# Patient Record
Sex: FEMALE | Race: OTHER | Employment: FULL TIME | URBAN - METROPOLITAN AREA
[De-identification: names, ages, dates, MRNs, and addresses within clinical notes are randomized per-mention and may not be internally consistent; named-entity substitution may affect disease eponyms.]

---

## 2023-03-13 ENCOUNTER — HOSPITAL ENCOUNTER (EMERGENCY)
Facility: HOSPITAL | Age: 28
Discharge: HOME | End: 2023-03-13
Payer: COMMERCIAL

## 2023-03-13 VITALS
OXYGEN SATURATION: 100 % | SYSTOLIC BLOOD PRESSURE: 96 MMHG | TEMPERATURE: 97 F | BODY MASS INDEX: 19 KG/M2 | DIASTOLIC BLOOD PRESSURE: 65 MMHG | WEIGHT: 125 LBS | RESPIRATION RATE: 18 BRPM | HEART RATE: 91 BPM

## 2023-03-13 DIAGNOSIS — K62.89 ANAL OR RECTAL PAIN: Primary | ICD-10-CM

## 2023-03-13 LAB
ALBUMIN SERPL-MCNC: 4.2 G/DL (ref 3.4–5)
ALBUMIN/GLOB SERPL: 1.4 {RATIO} (ref 1–2)
ALP LIVER SERPL-CCNC: 53 U/L
ALT SERPL-CCNC: 10 U/L
ANION GAP SERPL CALC-SCNC: 4 MMOL/L (ref 0–18)
AST SERPL-CCNC: 10 U/L (ref 15–37)
BASOPHILS # BLD AUTO: 0.03 X10(3) UL (ref 0–0.2)
BASOPHILS NFR BLD AUTO: 0.3 %
BILIRUB SERPL-MCNC: 0.6 MG/DL (ref 0.1–2)
BILIRUB UR QL: NEGATIVE
BUN BLD-MCNC: 14 MG/DL (ref 7–18)
BUN/CREAT SERPL: 19.2 (ref 10–20)
CALCIUM BLD-MCNC: 8.9 MG/DL (ref 8.5–10.1)
CHLORIDE SERPL-SCNC: 106 MMOL/L (ref 98–112)
CO2 SERPL-SCNC: 27 MMOL/L (ref 21–32)
COLOR UR: YELLOW
CREAT BLD-MCNC: 0.73 MG/DL
DEPRECATED RDW RBC AUTO: 39 FL (ref 35.1–46.3)
EOSINOPHIL # BLD AUTO: 0.01 X10(3) UL (ref 0–0.7)
EOSINOPHIL NFR BLD AUTO: 0.1 %
ERYTHROCYTE [DISTWIDTH] IN BLOOD BY AUTOMATED COUNT: 12.3 % (ref 11–15)
GFR SERPLBLD BASED ON 1.73 SQ M-ARVRAT: 115 ML/MIN/1.73M2 (ref 60–?)
GLOBULIN PLAS-MCNC: 3 G/DL (ref 2.8–4.4)
GLUCOSE BLD-MCNC: 112 MG/DL (ref 70–99)
GLUCOSE UR-MCNC: NEGATIVE MG/DL
HCT VFR BLD AUTO: 33.7 %
HGB BLD-MCNC: 11.5 G/DL
HGB UR QL STRIP.AUTO: NEGATIVE
IMM GRANULOCYTES # BLD AUTO: 0.03 X10(3) UL (ref 0–1)
IMM GRANULOCYTES NFR BLD: 0.3 %
LIPASE SERPL-CCNC: 19 U/L (ref 13–75)
LYMPHOCYTES # BLD AUTO: 1.22 X10(3) UL (ref 1–4)
LYMPHOCYTES NFR BLD AUTO: 11.3 %
MCH RBC QN AUTO: 29.4 PG (ref 26–34)
MCHC RBC AUTO-ENTMCNC: 34.1 G/DL (ref 31–37)
MCV RBC AUTO: 86.2 FL
MONOCYTES # BLD AUTO: 0.74 X10(3) UL (ref 0.1–1)
MONOCYTES NFR BLD AUTO: 6.8 %
NEUTROPHILS # BLD AUTO: 8.8 X10 (3) UL (ref 1.5–7.7)
NEUTROPHILS # BLD AUTO: 8.8 X10(3) UL (ref 1.5–7.7)
NEUTROPHILS NFR BLD AUTO: 81.2 %
NITRITE UR QL STRIP.AUTO: NEGATIVE
OSMOLALITY SERPL CALC.SUM OF ELEC: 285 MOSM/KG (ref 275–295)
PH UR: 5 [PH] (ref 5–8)
PLATELET # BLD AUTO: 284 10(3)UL (ref 150–450)
POTASSIUM SERPL-SCNC: 4.4 MMOL/L (ref 3.5–5.1)
PROT SERPL-MCNC: 7.2 G/DL (ref 6.4–8.2)
PROT UR-MCNC: 100 MG/DL
RBC # BLD AUTO: 3.91 X10(6)UL
SODIUM SERPL-SCNC: 137 MMOL/L (ref 136–145)
SP GR UR STRIP: 1.02 (ref 1–1.03)
UROBILINOGEN UR STRIP-ACNC: <2
VIT C UR-MCNC: NEGATIVE MG/DL
WBC # BLD AUTO: 10.8 X10(3) UL (ref 4–11)

## 2023-03-13 PROCEDURE — 80053 COMPREHEN METABOLIC PANEL: CPT

## 2023-03-13 PROCEDURE — 81001 URINALYSIS AUTO W/SCOPE: CPT | Performed by: NURSE PRACTITIONER

## 2023-03-13 PROCEDURE — 96374 THER/PROPH/DIAG INJ IV PUSH: CPT

## 2023-03-13 PROCEDURE — 87086 URINE CULTURE/COLONY COUNT: CPT | Performed by: NURSE PRACTITIONER

## 2023-03-13 PROCEDURE — 99284 EMERGENCY DEPT VISIT MOD MDM: CPT

## 2023-03-13 PROCEDURE — 85025 COMPLETE CBC W/AUTO DIFF WBC: CPT

## 2023-03-13 PROCEDURE — 83690 ASSAY OF LIPASE: CPT | Performed by: NURSE PRACTITIONER

## 2023-03-13 RX ORDER — KETOROLAC TROMETHAMINE 15 MG/ML
15 INJECTION, SOLUTION INTRAMUSCULAR; INTRAVENOUS ONCE
Status: COMPLETED | OUTPATIENT
Start: 2023-03-13 | End: 2023-03-13

## 2023-03-13 NOTE — ED INITIAL ASSESSMENT (HPI)
Patient with c/o rectal pain that began this am. Also with c/o BRBPR when she has bowel movements. States it is only a scant amount of blood.

## 2023-03-13 NOTE — DISCHARGE INSTRUCTIONS
Tylenol 650 mg every 6 hours or 1000 mg every 8 hours for pain. Motrin 600 every 6 hours or 800 every 8 hours for pain. Do not take Motrin if you are already taking naproxen, Advil, Aleve, Ibuprofen, Voltaren or Toradol as these are all similar drugs. Do not take any of these drugs long-term. See your Surgeon this week. You can sign into [email or web address] and share your lab results. Stay Hydrated. Return to the ER with new or worsening concerns.

## 2023-09-28 ENCOUNTER — OFFICE VISIT (OUTPATIENT)
Dept: GASTROENTEROLOGY | Facility: CLINIC | Age: 28
End: 2023-09-28

## 2023-09-28 ENCOUNTER — TELEPHONE (OUTPATIENT)
Facility: CLINIC | Age: 28
End: 2023-09-28

## 2023-09-28 VITALS — BODY MASS INDEX: 19.26 KG/M2 | HEIGHT: 69 IN | WEIGHT: 130 LBS

## 2023-09-28 DIAGNOSIS — Z87.19 HISTORY OF ANAL FISSURES: ICD-10-CM

## 2023-09-28 DIAGNOSIS — K62.5 ANAL BLEEDING: Primary | ICD-10-CM

## 2023-09-28 DIAGNOSIS — K62.5 RECTAL BLEEDING: Primary | ICD-10-CM

## 2023-09-28 DIAGNOSIS — Z98.890 H/O HEMORRHOIDECTOMY: ICD-10-CM

## 2023-09-28 DIAGNOSIS — K64.5 THROMBOSED HEMORRHOIDS: ICD-10-CM

## 2023-09-28 DIAGNOSIS — Z98.890 HISTORY OF HEMORRHOIDECTOMY: ICD-10-CM

## 2023-09-28 PROCEDURE — 99204 OFFICE O/P NEW MOD 45 MIN: CPT | Performed by: INTERNAL MEDICINE

## 2023-09-28 PROCEDURE — 3008F BODY MASS INDEX DOCD: CPT | Performed by: INTERNAL MEDICINE

## 2023-09-28 RX ORDER — TRAMADOL HYDROCHLORIDE 50 MG/1
50 TABLET ORAL EVERY 6 HOURS PRN
COMMUNITY

## 2023-09-28 NOTE — TELEPHONE ENCOUNTER
Scheduled for:  Colonoscopy 57639  Provider Name:  Dr Brayan Corona  Date:  3/11/2024  Location: [facility]    Sedation:  MAC  Time:  5133 (pt is aware that [facility] will call the day before to confirm arrival time)    Prep:  Colyte  Meds/Allergies Reconciled?:  Physician reviewed  Diagnosis with codes:  Rectal Bleeding K6.5; Anal Fissures Z87.19; Thrombosed Hemorrhoids K64.5; Hx of Hemorrhoidectomy Z98.890  Was patient informed to call insurance with codes (Y/N):       Referral sent?: Referral was sent at the time of electronic surgical scheduling. [facility] or [address] notified?:  I sent an electronic request to Endo Scheduling and received a confirmation today. Medication Orders:  Pt is aware to NOT take iron pills, herbal meds and diet supplements for 7 days before exam. Also to NOT take any form of alcohol, recreational drugs and any forms of ED meds 24 hours before exam.     Misc Orders:       Further instructions given by staff:  I discussed the prep intructions with the patient in office which SHE verbally understood. Copy of instructions was handed to patient as well. Patient was also advised about cancellation policy.

## 2023-09-28 NOTE — H&P
[address] [facility]                                                                                                               Reason for consult: anal pain    Requesting physician or provider: Sarath Zamora    Patient presents with:  Consult      HPI:   Joel Aden is a 29year old year-old female here for the following:    Symptoms started 1 year ago with anal pain and bleeding. She was told she had hemorrhoids and pt was given conservative treatments, which didn't help. Then, pt saw her PCP, who referred her to a Dr. Edgard Klein from general surgery, who diagnosed an anal fissure. A procedure was done requiring anesthesia (digital rectal dilatation per chart review), because he wasn't able to even do a REJI in office. The day after, she had anal pain again. She was evaluated by another surgeon since Dr. Edgard Klein was out of the office and an anal fissure was diagnosed. On an office visit 2/2023 with Dr. Edgard Klein, she had no pain and rectal exam showed a thrombosed hemorrhoid. She then had surgical repair of the fissure along with a hemorrhoidectomy. She then followed up with him in March, and a sphincterectomy was recommended for a persistent fissure. She was symptomatic at that time. She felt well until the past week, when she developed anal pain with defecation and rectal bleeding again. Pt states she had stopped taking miralax every day and was taking it every other day or PRN until this past week. She notes that her stools may have been harder than usual. She tries to avoid straining or spending too much time on the toilet. Her BM yesterday was \"OK\" since she was able to prepare herself. Overall over the past week, her pain is not as severe as it was when symptoms first flared a week ago, and her rectal bleeding is intermittent after a BM.   Denies any other symptoms - no rashes, vision changes, joint pains, diarrhea, hematochezia, f/c, unintentional weight loss, or any other symptoms. Denies family h/o IBD or CRC. Prior endoscopies:  None. Soc:  -denies smoking  -denies heavy Etoh  -no illicit drug use    Wt Readings from Last 6 Encounters:  09/28/23 : 130 lb (59 kg)  03/13/23 : 125 lb (56.7 kg)       History, Medications, Allergies, ROS:      History reviewed. No pertinent past medical history. History reviewed. No pertinent surgical history. Family Hx: History reviewed. No pertinent family history. Social History:   Social History     Socioeconomic History    Marital status: Single   Tobacco Use    Smoking status: Never    Smokeless tobacco: Never   Substance and Sexual Activity    Alcohol use: Yes        Medications (Active prior to today's visit):  Current Outpatient Medications   Medication Sig Dispense Refill    traMADol 50 MG Oral Tab Take 1 tablet (50 mg total) by mouth every 6 (six) hours as needed for Pain.      polyethylene glycol, PEG 3350-KCl-NaBcb-NaCl-NaSulf, 236 g Oral Recon Soln Take 4,000 mL by mouth once for 1 dose. As directed by GI clinic instructions. 4000 mL 0       Allergies:  Not on File    ROS:   CONSTITUTIONAL:  negative for fevers, rigors  EYES:  negative for diplopia   RESPIRATORY:  negative for severe shortness of breath  CARDIOVASCULAR:  negative for crushing sub-sternal chest pain  GASTROINTESTINAL:  see HPI  GENITOURINARY:  negative for dysuria or gross hematuria  INTEGUMENT/BREAST:  SKIN:  negative for jaundice   ALLERGIC/IMMUNOLOGIC:  negative for hay fever  ENDOCRINE:  negative for cold intolerance and heat intolerance  MUSCULOSKELETAL:  negative for joint effusion/severe erythema  BEHAVIOR/PSYCH:  negative for psychotic behavior      PHYSICAL EXAM:   Height 5' 9\" (1.753 m), weight 130 lb (59 kg), last menstrual period 02/21/2023.     GEN - Patient appears comfortable and in no acute discomfort  ENT - MMM  EYES - the sclera appears anicteric  CV - no edema  RESP -  No increased work of breathing  ABDOMEN - soft, non-tender exam in all quadrants without rigidity or guarding, non-distended, no abnormal bowel sounds noted, no masses are palpated  REJI - resolving thrombosed external hemorrhoid noted; no anal fissure noted; REJI deferred   SKIN - No jaundice  NEURO - Alert and appropriate, and gross movements of extremities normal  PSYCH - normal affect, non-agitated      Labs/Imaging:     Patient's pertinent labs and imaging were reviewed and discussed with patient today. .  ASSESSMENT/PLAN:   Janae Hirsch is a 29year old year-old female here for the following:    H/o anal fissure  H/o hemorrhoids   H/o surgical repair of her anal fissure and hemorrhoidectomy [facility]/2023 at [facility]  Anal pain and rectal bleeding - recurrent over the past week and rectal exam today consistent with resolving thrombosed hemorrhoid. No fissure was appreciated. The pt's most recent flare of symptoms is likely due to a thrombosed hemorrhoid which has nearly resolved on exam today. Her pain is overall improving steadily. Recommend avoiding straining/constipation and keeping stools soft with miralax every day. Given her h/o recurrent anal fissure despite surgical intervention by Dr. Shahab Silverio, while there is no fissure noted on exam today, given previous findings of a persistent fissure and recommendations of her previous surgeon, recommend CLN to r/o Crohn's and referral to a colorectal surgeon for another opinion to determine the need for further surgical intervention.       Recommend    - CLN with MAC    - CLD the day prior, NPO after midnight, split dose golytely    - miralax daily - OK to adjust the dose to keep the stools soft and regular    - avoid straining or spending too much time on the toilet    - referral to colorectal surgery, Dr. Mariajose Jose    Colonoscopy consent: I have discussed the risks, benefits, and alternatives to colonoscopy with the patient/primary decision maker [who demonstrated understanding], including but not limited to the risks of bleeding, infection, pain, death, as well as the risks of anesthesia and perforation all leading to prolonged hospitalization, surgical intervention, or even death. I also specifically mentioned the miss rate of colonoscopy of 5-10% in the best of all circumstances. The patient has agreed to sign an informed consent and elected to proceed with colonoscopy with possible intervention [i.e. polypectomy, stent placement, etc.] as indicated. Orders This Visit:  No orders of the defined types were placed in this encounter. Meds This Visit:  Requested Prescriptions     Signed Prescriptions Disp Refills    polyethylene glycol, PEG 3350-KCl-NaBcb-NaCl-NaSulf, 236 g Oral Recon Soln 4000 mL 0     Sig: Take 4,000 mL by mouth once for 1 dose. As directed by GI clinic instructions. Imaging & Referrals:  SURGERY - Raynette Lundborg, MD          This note was partially prepared using "[employer] voice recognition dictation software. As a result, errors may occur. When identified, these errors have been corrected.  While every attempt is made to correct errors during dictation, discrepancies may still exist.

## 2023-09-28 NOTE — PATIENT INSTRUCTIONS
1. Schedule colonoscopy with MAC [Diagnosis: rectal bleeding, anal fissure]    2.  bowel prep from pharmacy (split dose golytely)    3. Continue all medications for procedure    4. Read all bowel prep instructions carefully    5. AVOID seeds, nuts, popcorn, raw fruits and vegetables (cooked is okay) for 3 days before procedure    6. If you start any NEW medication after your visit today, please notify us. Certain medications will need to be held before the procedure, or the procedure cannot be performed.        Colorectal surgeon referral - please call to schedule an appointment:  Dr. Melida See  [phone]

## 2023-10-19 ENCOUNTER — OFFICE VISIT (OUTPATIENT)
Facility: LOCATION | Age: 28
End: 2023-10-19
Payer: COMMERCIAL

## 2023-10-19 VITALS — HEART RATE: 98 BPM | TEMPERATURE: 98 F

## 2023-10-19 DIAGNOSIS — K60.2 ANAL FISSURE: Primary | ICD-10-CM

## 2023-10-19 PROCEDURE — 99203 OFFICE O/P NEW LOW 30 MIN: CPT | Performed by: STUDENT IN AN ORGANIZED HEALTH CARE EDUCATION/TRAINING PROGRAM

## 2023-10-20 ENCOUNTER — TELEPHONE (OUTPATIENT)
Facility: LOCATION | Age: 28
End: 2023-10-20

## 2024-01-29 ENCOUNTER — TELEPHONE (OUTPATIENT)
Facility: CLINIC | Age: 29
End: 2024-01-29

## 2024-03-11 ENCOUNTER — OFFICE VISIT (OUTPATIENT)
Facility: LOCATION | Age: 29
End: 2024-03-11
Payer: COMMERCIAL

## 2024-03-11 ENCOUNTER — TELEPHONE (OUTPATIENT)
Facility: CLINIC | Age: 29
End: 2024-03-11

## 2024-03-11 VITALS — TEMPERATURE: 98 F | HEART RATE: 98 BPM

## 2024-03-11 DIAGNOSIS — K64.5 THROMBOSED HEMORRHOIDS: Primary | ICD-10-CM

## 2024-03-11 PROCEDURE — 99213 OFFICE O/P EST LOW 20 MIN: CPT | Performed by: STUDENT IN AN ORGANIZED HEALTH CARE EDUCATION/TRAINING PROGRAM

## 2024-03-11 RX ORDER — HYDROCORTISONE ACETATE PRAMOXINE HCL 2.5; 1 G/100G; G/100G
1 CREAM TOPICAL 2 TIMES DAILY
Qty: 30 G | Refills: 0 | Status: SHIPPED | OUTPATIENT
Start: 2024-03-11

## 2024-03-11 NOTE — PROGRESS NOTES
Follow Up Visit Note       Active Problems      1. Thrombosed hemorrhoids          Chief Complaint   Chief Complaint   Patient presents with    [facility]     Est - Anal Fissure - Pt states she feels hemorrhoids externally, Pt c/o bleeding w/ BM's          History of Present Illness  29-year-old female with past medical history of anal fissure and hemorrhoids, returns to clinic today with concern for recurrence of both her fissure and hemorrhoids.  Patient was last seen here in October 2023 where at that time she had no ongoing anorectal symptoms and no evidence of fissure on bedside examination.  Patient reports about 2 to 3 weeks ago while traveling to [place], she developed some rectal discomfort and swelling, which she presumed could be secondary to her hemorrhoids.  She started using her prescribed lidocaine/nifedipine ointment at that time.  Patient also recently travel to the [facility] last week, and on her way home, she developed worsening anorectal symptoms.  Patient reports sharp, severe rectal pain at onset of bowel movement which she feels is similar to her previous anal fissure pain.  She also notes that her hemorrhoids are protruding.  Over the weekend, patient started to experience bright red blood per rectum with bowel movements.  She reports bright red blood on tissue paper, as well as in toilet bowl.  She states that she is having 1-2 soft bowel movements a day.  She denies any recent straining with bowel movements.  Takes MiraLAX daily.    Past surgical history notable for anal exam under anesthesia with anal dilation with Dr. Karen Jeffery a general surgeon at [facility] in [place], [place] on 1/23/2023.  She underwent repeat anal exam under anesthesia with dilation and excisional hemorrhoidectomy x2 on 3/15/2023.  She has yet to schedule a colonoscopy.    Allergies  Lachelle has no allergies on file.    Past Medical / Surgical / Social / Family History    The past medical and past surgical history  have been reviewed by me today.    History reviewed. No pertinent past medical history.  History reviewed. No pertinent surgical history.    The family history and social history have been reviewed by me today.    History reviewed. No pertinent family history.  Social History     Socioeconomic History    Marital status: Single   Tobacco Use    Smoking status: Never    Smokeless tobacco: Never   Substance and Sexual Activity    Alcohol use: Yes        Current Outpatient Medications:     Hydrocort-Pramoxine, Perianal, (ANALPRAM HC) 2.5-1 % External Cream, Apply 1 Application topically in the morning and 1 Application before bedtime., Disp: 30 g, Rfl: 0    traMADol 50 MG Oral Tab, Take 1 tablet (50 mg total) by mouth every 6 (six) hours as needed for Pain., Disp: , Rfl:      Review of Systems  A 10 point review of systems was performed and negative unless otherwise documented per HPI.     Physical Findings   Pulse 98   Temp 98.3 °F (36.8 °C)   LMP 02/21/2023 (Approximate)   Physical Exam  Vitals and nursing note reviewed. Exam conducted with a chaperone present.   Constitutional:       General: She is not in acute distress.  HENT:      Head: Normocephalic and atraumatic.      Mouth/Throat:      Mouth: Mucous membranes are moist.   Cardiovascular:      Rate and Rhythm: Normal rate and regular rhythm.   Pulmonary:      Effort: Pulmonary effort is normal.   Abdominal:      General: There is no distension.      Palpations: Abdomen is soft.      Tenderness: There is no abdominal tenderness.   Genitourinary:     Comments: Patient examined in the prone jackknife position with female physician assistant chaperone present.  External exam of the anus reveals a 1.5 cm thrombosed hemorrhoid in the right posterior position.  This area is tender to palpation and appears to be the source of the patient's anorectal pain.  Musculoskeletal:         General: No deformity.   Skin:     General: Skin is warm and dry.   Neurological:       General: No focal deficit present.      Mental Status: She is alert.   Psychiatric:         Mood and Affect: Mood normal.          Assessment   1. Thrombosed hemorrhoids      Lachelle Jarrell is a very nice 29 year old female with history of anal fissure and symptomatic hemorrhoids previously under the care of a [facility] at [facility] [place], [place] who established care with me on 10/19/2023.    Patient reports about 2 to 3 weeks ago while traveling to [place], she developed some rectal discomfort and swelling, which she presumed could be secondary to her hemorrhoids.  She started using her prescribed lidocaine/nifedipine ointment at that time.  Patient also recently travel to the [facility] last week, and on her way home, she developed worsening anorectal symptoms.  Patient reports sharp, severe rectal pain at onset of bowel movement which she feels is similar to her previous anal fissure pain.  She also notes that her hemorrhoids are protruding.  Over the weekend, patient started to experience bright red blood per rectum with bowel movements.  She reports bright red blood on tissue paper, as well as in toilet bowl.  She states that she is having 1-2 soft bowel movements a day.  She denies any recent straining with bowel movements.  Takes MiraLAX daily.  She has yet to schedule a colonoscopy.    External exam of the anus reveals a 1.5 cm thrombosed hemorrhoid in the right posterior position.  This area is tender to palpation and appears to be the source of the patient's anorectal pain.  Patient did tell me that the pain and swelling are now beginning to improve.    Plan   I counseled patient on my exam findings that she has a large thrombosed hemorrhoid in the right posterior position which appears to be the source of her pain.  I recommend ongoing supportive care for the thrombosed hemorrhoid with pain medication, topical medication, bowel regimen and sitz bath's.  I counseled patient that the natural history of  thrombosed hemorrhoids are to slowly resolve with time and may result in a skin tag.  I encouraged patient to schedule a colonoscopy with her gastroenterologist.  I would like to see her back in the next 1-2 months for re-evaluation.  Return precautions in the meantime discussed.  Patient expressed understanding and was agreeable to plan.     No orders of the defined types were placed in this encounter.      Imaging & Referrals   None    Follow Up  No follow-ups on file.    Jax Melcohr MD

## 2024-03-19 ENCOUNTER — TELEPHONE (OUTPATIENT)
Facility: CLINIC | Age: 29
End: 2024-03-19

## 2024-03-19 NOTE — TELEPHONE ENCOUNTER
04/01 is holding for the patient she will call back at the end of the day once she speaks with her boss

## 2024-03-19 NOTE — TELEPHONE ENCOUNTER
Patient calling to inform nurse that she will not be able to accept that date. Please will call back tomorrow, thanks.

## 2024-03-19 NOTE — TELEPHONE ENCOUNTER
03/25 is holding for the patient she will call back at the end of the day once she speaks with her boss

## 2024-03-25 ENCOUNTER — TELEPHONE (OUTPATIENT)
Facility: CLINIC | Age: 29
End: 2024-03-25

## 2024-03-25 NOTE — TELEPHONE ENCOUNTER
[employer] message read: Last read by Lachelle Jarrell at 10:32 AM on 3/25/2024.     No further action required at this time, TE closed.

## 2024-03-25 NOTE — TELEPHONE ENCOUNTER
Pt is requesting for prep medicine please follow up [id number] - [place], [place] - [facility] [address] AT [facility] [address], [phone], [phone]

## 2024-03-25 NOTE — TELEPHONE ENCOUNTER
Prep sent to pharmacy below, notiifed pt via [email or web address] message sent.     Pt to call GI office if having questions.

## 2024-04-01 PROCEDURE — 88305 TISSUE EXAM BY PATHOLOGIST: CPT | Performed by: INTERNAL MEDICINE

## 2024-11-05 ENCOUNTER — OFFICE VISIT (OUTPATIENT)
Facility: LOCATION | Age: 29
End: 2024-11-05
Payer: COMMERCIAL

## 2024-11-05 VITALS
HEART RATE: 98 BPM | TEMPERATURE: 98 F | OXYGEN SATURATION: 100 % | SYSTOLIC BLOOD PRESSURE: 95 MMHG | DIASTOLIC BLOOD PRESSURE: 65 MMHG

## 2024-11-05 DIAGNOSIS — K60.2 ANAL FISSURE: Primary | ICD-10-CM

## 2024-11-05 DIAGNOSIS — K62.89 RECTAL PAIN: ICD-10-CM

## 2024-11-05 DIAGNOSIS — K64.4 EXTERNAL HEMORRHOID: ICD-10-CM

## 2024-11-05 PROCEDURE — 99213 OFFICE O/P EST LOW 20 MIN: CPT | Performed by: STUDENT IN AN ORGANIZED HEALTH CARE EDUCATION/TRAINING PROGRAM

## 2024-11-14 NOTE — PROGRESS NOTES
Follow Up Visit Note       Active Problems      1. Anal fissure    2. External hemorrhoid    3. Rectal pain          Chief Complaint   Chief Complaint   Patient presents with    [facility]     EP - Hemorrhoids F/U , discomfort, bleeding after using restroom, no other symptoms.         History of Present Illness  This is a very nice 29-year-old female with history of an anal fissure and symptomatic hemorrhoids who returns to clinic today with concern for recurrence of both her fissure and hemorrhoids.  Past surgical history notable for anal exam under anesthesia with anal dilation with Dr. Karen Jeffery a general surgeon at [facility] in [place], [place] on 1/23/2023.  She underwent repeat anal exam under anesthesia with dilation and excisional hemorrhoidectomy x2 on 3/15/2023. She underwent a colonoscopy with Dr. Sanz on 4/1/2024 which showed small internal hemorrhoids and anal skin tags.    Patient continues to have ongoing, intermittent rectal pain and bleeding.  The rectal pain generally happens after bowel movements.  At times, the pain occurs even if she is not having a bowel movement.  She had an episode of bad rectal pain around a month ago with bleeding.  The bleeding is primarily with wiping after bowel movements.  She feels she is always constipated.  Denies any prolapse, seepage, itching or incontinence.  She is using the nifedipine/lidocaine ointment as needed when she has episodes of bad pain.    Allergies  Lachelle has No Known Allergies.    Past Medical / Surgical / Social / Family History    The past medical and past surgical history have been reviewed by me today.    History reviewed. No pertinent past medical history.  Past Surgical History:   Procedure Laterality Date    Colonoscopy N/A 4/1/2024    Procedure: COLONOSCOPY;  Surgeon: Justina Sanz MD;  Location: [facility] MAIN OR    General anesthesia      hemorrhoidectomy       The family history and social history have been reviewed by me today.    History  reviewed. No pertinent family history.  Social History     Socioeconomic History    Marital status: Single   Tobacco Use    Smoking status: Never    Smokeless tobacco: Never   Substance and Sexual Activity    Alcohol use: Yes        Current Outpatient Medications:     Hydrocort-Pramoxine, Perianal, (ANALPRAM HC) 2.5-1 % External Cream, Apply 1 Application topically in the morning and 1 Application before bedtime., Disp: 30 g, Rfl: 0    traMADol 50 MG Oral Tab, Take 1 tablet (50 mg total) by mouth every 6 (six) hours as needed for Pain. (Patient not taking: Reported on 11/5/2024), Disp: , Rfl:      Review of Systems  A 10 point review of systems was performed and negative unless otherwise documented per HPI.     Physical Findings   BP 95/65 (BP Location: Left arm, Patient Position: Sitting, Cuff Size: adult)   Pulse 98   Temp 97.8 °F (36.6 °C) (Temporal)   LMP 03/20/2024   SpO2 100%   Physical Exam  Vitals and nursing note reviewed. Exam conducted with a chaperone present.   Constitutional:       General: She is not in acute distress.  HENT:      Head: Normocephalic and atraumatic.      Mouth/Throat:      Mouth: Mucous membranes are moist.   Cardiovascular:      Rate and Rhythm: Normal rate and regular rhythm.   Pulmonary:      Effort: Pulmonary effort is normal.   Abdominal:      General: There is no distension.      Palpations: Abdomen is soft.      Tenderness: There is no abdominal tenderness.   Genitourinary:     Comments: Patient examined in the prone jackknife position with female physician assistant chaperone present.  External exam of the anus reveals a 1.5 cm prominent right posterior external hemorrhoid.  Gentle spreading of the anoderm reveals a skin tear in the posterior midline with tenderness.  Digital rectal exam and anoscopy deferred.  Musculoskeletal:         General: No deformity.   Skin:     General: Skin is warm and dry.   Neurological:      General: No focal deficit present.      Mental  Status: She is alert.   Psychiatric:         Mood and Affect: Mood normal.          Assessment   1. Anal fissure    2. External hemorrhoid    3. Rectal pain      This is a very nice 29-year-old female with history of an anal fissure and symptomatic hemorrhoids who returns to clinic today with concern for recurrence of both her fissure and hemorrhoids.  Past surgical history notable for anal exam under anesthesia with anal dilation with Dr. Karen Jeffery a general surgeon at [facility] in [place], [place] on 1/23/2023.  She underwent repeat anal exam under anesthesia with dilation and excisional hemorrhoidectomy x2 on 3/15/2023. She underwent a colonoscopy with Dr. Sanz on 4/1/2024 which showed small internal hemorrhoids and anal skin tags.    Patient continues to have ongoing, intermittent rectal pain and bleeding.  The rectal pain generally happens after bowel movements.  At times, the pain occurs even if she is not having a bowel movement.  She had an episode of bad rectal pain around a month ago with bleeding.  The bleeding is primarily with wiping after bowel movements.  She feels she is always constipated.  Denies any prolapse, seepage, itching or incontinence.  She is using the nifedipine/lidocaine ointment as needed when she has episodes of bad pain.    External exam of the anus reveals a 1.5 cm prominent right posterior external hemorrhoid.  Gentle spreading of the anoderm reveals a skin tear in the posterior midline with tenderness.  Digital rectal exam and anoscopy deferred    Plan   I counseled patient on my exam findings that she has a prominent posterior external hemorrhoid/skin in addition to hypertonic sphincter tone with tearing of the posterior midline anoderm and possible fissure.  I recommend planning to go to the operating room for anal exam by anesthesia anoscopy, perianal injection of Botox and external hemorrhoidectomy.  The details of this procedure were discussed including the expected recovery  time, risks, benefits and alternatives.  Patient expressed understanding and was agreeable to schedule surgery with me.  This has been scheduled for 12/20/2024.    In the meantime, I suggest a high-fiber diet, daily fiber supplement, drink at least 64 ounces of fluids daily and limiting toilet time/straining.  I advised patient to continue to use the nifedipine/lidocaine ointment at least twice a day if possible, ideally 3 times a day.  Patient expressed understanding and was agreeable to plan.     No orders of the defined types were placed in this encounter.      Imaging & Referrals   None    Follow Up  No follow-ups on file.    Jax Melchor MD

## 2024-12-12 ENCOUNTER — TELEPHONE (OUTPATIENT)
Facility: LOCATION | Age: 29
End: 2024-12-12

## 2024-12-12 NOTE — TELEPHONE ENCOUNTER
Prior authorization approved for cpt codes 66144, 37205, 18306, . Ref #: [id number].    Start date: 12/6/24  End date: 12/20/24

## 2024-12-15 ENCOUNTER — PATIENT MESSAGE (OUTPATIENT)
Facility: LOCATION | Age: 29
End: 2024-12-15

## 2024-12-19 ENCOUNTER — TELEPHONE (OUTPATIENT)
Facility: LOCATION | Age: 29
End: 2024-12-19

## 2024-12-19 NOTE — TELEPHONE ENCOUNTER
Spoke with patient. She is starting her menstrual cycle and is wondering if it is ok to wear a tampon for the procedure, which was ok'd. Also just confirming tylenol and Gatorade instructions that were given to her.

## 2024-12-19 NOTE — TELEPHONE ENCOUNTER
Patient calling with questions about her prep for her procedure tomorrow 12/20.     Please advise  Best callback number is [phone]

## 2024-12-20 ENCOUNTER — ANESTHESIA (OUTPATIENT)
Dept: SURGERY | Facility: HOSPITAL | Age: 29
End: 2024-12-20
Payer: COMMERCIAL

## 2024-12-20 ENCOUNTER — ANESTHESIA EVENT (OUTPATIENT)
Dept: SURGERY | Facility: HOSPITAL | Age: 29
End: 2024-12-20
Payer: COMMERCIAL

## 2024-12-20 ENCOUNTER — HOSPITAL ENCOUNTER (OUTPATIENT)
Facility: HOSPITAL | Age: 29
Setting detail: HOSPITAL OUTPATIENT SURGERY
Discharge: HOME | End: 2024-12-20
Attending: STUDENT IN AN ORGANIZED HEALTH CARE EDUCATION/TRAINING PROGRAM | Admitting: STUDENT IN AN ORGANIZED HEALTH CARE EDUCATION/TRAINING PROGRAM

## 2024-12-20 PROBLEM — K64.3 GRADE IV HEMORRHOIDS: Status: ACTIVE | Noted: 2024-12-20

## 2024-12-20 RX ORDER — ROCURONIUM BROMIDE 10 MG/ML
INJECTION, SOLUTION INTRAVENOUS AS NEEDED
Status: DISCONTINUED | OUTPATIENT
Start: 2024-12-20 | End: 2024-12-20 | Stop reason: SURG

## 2024-12-20 RX ORDER — DEXAMETHASONE SODIUM PHOSPHATE 4 MG/ML
VIAL (ML) INJECTION AS NEEDED
Status: DISCONTINUED | OUTPATIENT
Start: 2024-12-20 | End: 2024-12-20 | Stop reason: SURG

## 2024-12-20 RX ORDER — ONDANSETRON 2 MG/ML
INJECTION INTRAMUSCULAR; INTRAVENOUS AS NEEDED
Status: DISCONTINUED | OUTPATIENT
Start: 2024-12-20 | End: 2024-12-20 | Stop reason: SURG

## 2024-12-20 RX ORDER — KETOROLAC TROMETHAMINE 30 MG/ML
INJECTION, SOLUTION INTRAMUSCULAR; INTRAVENOUS AS NEEDED
Status: DISCONTINUED | OUTPATIENT
Start: 2024-12-20 | End: 2024-12-20 | Stop reason: SURG

## 2024-12-20 RX ADMIN — METRONIDAZOLE 500 MG: 500 INJECTION, SOLUTION INTRAVENOUS at 10:57:00

## 2024-12-20 RX ADMIN — ROCURONIUM BROMIDE 20 MG: 10 INJECTION, SOLUTION INTRAVENOUS at 11:01:00

## 2024-12-20 RX ADMIN — SODIUM CHLORIDE, SODIUM LACTATE, POTASSIUM CHLORIDE, CALCIUM CHLORIDE: 600; 310; 30; 20 INJECTION, SOLUTION INTRAVENOUS at 11:41:00

## 2024-12-20 RX ADMIN — ROCURONIUM BROMIDE 10 MG: 10 INJECTION, SOLUTION INTRAVENOUS at 10:53:00

## 2024-12-20 RX ADMIN — KETOROLAC TROMETHAMINE 30 MG: 30 INJECTION, SOLUTION INTRAMUSCULAR; INTRAVENOUS at 11:12:00

## 2024-12-20 RX ADMIN — SODIUM CHLORIDE, SODIUM LACTATE, POTASSIUM CHLORIDE, CALCIUM CHLORIDE: 600; 310; 30; 20 INJECTION, SOLUTION INTRAVENOUS at 11:01:00

## 2024-12-20 RX ADMIN — DEXAMETHASONE SODIUM PHOSPHATE 8 MG: 4 MG/ML VIAL (ML) INJECTION at 11:00:00

## 2024-12-20 RX ADMIN — ONDANSETRON 4 MG: 2 INJECTION INTRAMUSCULAR; INTRAVENOUS at 11:00:00

## 2024-12-20 NOTE — H&P
Follow Up Visit Note       Active Problems      No diagnosis found.        Chief Complaint   No chief complaint on file.        History of Present Illness  This is a very nice 29-year-old female with history of an anal fissure and symptomatic hemorrhoids who returns to clinic today with concern for recurrence of both her fissure and hemorrhoids.  Past surgical history notable for anal exam under anesthesia with anal dilation with Dr. Karen Jeffery a [facility] surgeon at [facility] [place], [place] on 1/23/2023.  She underwent repeat anal exam under anesthesia with dilation and excisional hemorrhoidectomy x2 on 3/15/2023. She underwent a colonoscopy with Dr. Sanz on 4/1/2024 which showed small internal hemorrhoids and anal skin tags.    Patient continues to have ongoing, intermittent rectal pain and bleeding.  The rectal pain generally happens after bowel movements.  At times, the pain occurs even if she is not having a bowel movement.  She had an episode of bad rectal pain around a month ago with bleeding.  The bleeding is primarily with wiping after bowel movements.  She feels she is always constipated.  Denies any prolapse, seepage, itching or incontinence.  She is using the nifedipine/lidocaine ointment as needed when she has episodes of bad pain.    Allergies  Lachelle has No Known Allergies.    Past Medical / Surgical / Social / Family History    The past medical and past surgical history have been reviewed by me today.    History reviewed. No pertinent past medical history.  Past Surgical History:   Procedure Laterality Date    Colonoscopy N/A 4/1/2024    Procedure: COLONOSCOPY;  Surgeon: Justina Sanz MD;  Location: [facility] MAIN OR    General anesthesia      hemorrhoidectomy       The family history and social history have been reviewed by me today.    History reviewed. No pertinent family history.  Social History     Socioeconomic History    Marital status: Single   Tobacco Use    Smoking status: Never     Passive  exposure: Never    Smokeless tobacco: Never   Vaping Use    Vaping status: Never Used   Substance and Sexual Activity    Alcohol use: Yes     Comment: SOCIAL    Drug use: Never      No current outpatient medications on file.     Review of Systems  A 10 point review of systems was performed and negative unless otherwise documented per HPI.     Physical Findings   Ht 69\"   Wt 125 lb (56.7 kg)   LMP 11/20/2024 (Approximate)   BMI 18.46 kg/m²   Physical Exam  Vitals and nursing note reviewed. Exam conducted with a chaperone present.   Constitutional:       General: She is not in acute distress.  HENT:      Head: Normocephalic and atraumatic.      Mouth/Throat:      Mouth: Mucous membranes are moist.   Cardiovascular:      Rate and Rhythm: Normal rate and regular rhythm.   Pulmonary:      Effort: Pulmonary effort is normal.   Abdominal:      General: There is no distension.      Palpations: Abdomen is soft.      Tenderness: There is no abdominal tenderness.   Genitourinary:     Comments: Patient examined in the prone jackknife position with female physician assistant chaperone present.  External exam of the anus reveals a 1.5 cm prominent right posterior external hemorrhoid.  Gentle spreading of the anoderm reveals a skin tear in the posterior midline with tenderness.  Digital rectal exam and anoscopy deferred.  Musculoskeletal:         General: No deformity.   Skin:     General: Skin is warm and dry.   Neurological:      General: No focal deficit present.      Mental Status: She is alert.   Psychiatric:         Mood and Affect: Mood normal.          Assessment   No diagnosis found.    This is a very nice 29-year-old female with history of an anal fissure and symptomatic hemorrhoids who returns to clinic today with concern for recurrence of both her fissure and hemorrhoids.  Past surgical history notable for anal exam under anesthesia with anal dilation with Dr. Karen Jeffery a general surgeon at [facility] in [place], [place]  on 1/23/2023.  She underwent repeat anal exam under anesthesia with dilation and excisional hemorrhoidectomy x2 on 3/15/2023. She underwent a colonoscopy with Dr. Sanz on 4/1/2024 which showed small internal hemorrhoids and anal skin tags.    Patient continues to have ongoing, intermittent rectal pain and bleeding.  The rectal pain generally happens after bowel movements.  At times, the pain occurs even if she is not having a bowel movement.  She had an episode of bad rectal pain around a month ago with bleeding.  The bleeding is primarily with wiping after bowel movements.  She feels she is always constipated.  Denies any prolapse, seepage, itching or incontinence.  She is using the nifedipine/lidocaine ointment as needed when she has episodes of bad pain.    External exam of the anus reveals a 1.5 cm prominent right posterior external hemorrhoid.  Gentle spreading of the anoderm reveals a skin tear in the posterior midline with tenderness.  Digital rectal exam and anoscopy deferred    Plan   I counseled patient on my exam findings that she has a prominent posterior external hemorrhoid/skin in addition to hypertonic sphincter tone with tearing of the posterior midline anoderm and possible fissure.  I recommend planning to go to the operating room for anal exam by anesthesia anoscopy, perianal injection of Botox and external hemorrhoidectomy.  The details of this procedure were discussed including the expected recovery time, risks, benefits and alternatives.  Patient expressed understanding and was agreeable to schedule surgery with me.  This has been scheduled for 12/20/2024.    In the meantime, I suggest a high-fiber diet, daily fiber supplement, drink at least 64 ounces of fluids daily and limiting toilet time/straining.  I advised patient to continue to use the nifedipine/lidocaine ointment at least twice a day if possible, ideally 3 times a day.  Patient expressed understanding and was agreeable to plan.      No orders of the defined types were placed in this encounter.      Imaging & Referrals   VITAL SIGNS  NURSING COMMUNICATION  POCT PREGNANCY URINE  PLACE PIV  ACTIVITY AS TOLERATED  HEIGHT AND WEIGHT  INITIATE ADULT PREOP PROPHYLACTIC ABX PROTOCOL  VERIFY INFORMED CONSENT  NPO  VITAL SIGNS - NOTIFY PHYSICIAN    Follow Up  No follow-ups on file.    Jax Melchor MD

## 2024-12-20 NOTE — OPERATIVE REPORT
[facility]  Operative Note    Lachelle Jarrell Location: OR   [facility] [id number] MRN [id number]    1995 Age 29 year old   Admission Date 2024 Operation Date 2024   Attending Physician Jax Melchor MD Operating Physician Jax Melchor MD   PCP NASIM CACERES          Patient Name: Lachelle Jarrell    Preoperative Diagnosis: Anal fissure [K60.2]  External hemorrhoid [K64.4]  Rectal pain [K62.89]    Postoperative Diagnosis:   Grade 4 hemorrhoid  Anal fissure    Primary Surgeon: Jax Melchor MD    Assistant: Marielos Belle PA-C    Anesthesia: General    Procedures: Anal exam under anesthesia with anoscopy, excisional hemorrhoidectomy, perianal injection of Botox    Implants: None    Specimen: Right lateral hemorrhoid    Drains: None    Estimated Blood Loss: 5 cc    Complications: None immediate    Condition: Stable    Indications for Surgery:   This is a very nice 29-year-old female with history of an anal fissure and symptomatic hemorrhoids who returns to clinic today with concern for recurrence of both her fissure and hemorrhoids.  Past surgical history notable for anal exam under anesthesia with anal dilation with Dr. Karen Jeffery a general surgeon at [facility] in [place], [place] on 2023.  She underwent repeat anal exam under anesthesia with dilation and excisional hemorrhoidectomy x2 on 3/15/2023. She underwent a colonoscopy with Dr. Sanz on 2024 which showed small internal hemorrhoids and anal skin tags.     Patient continues to have ongoing, intermittent rectal pain and bleeding.  The rectal pain generally happens after bowel movements.  At times, the pain occurs even if she is not having a bowel movement.  She had an episode of bad rectal pain around a month ago with bleeding.  The bleeding is primarily with wiping after bowel movements.  She feels she is always constipated.  Denies any prolapse, seepage, itching or incontinence.  She is using the nifedipine/lidocaine ointment as needed when  she has episodes of bad pain.     External exam of the anus reveals a 1.5 cm prominent right posterior external hemorrhoid.  Gentle spreading of the anoderm reveals a skin tear in the posterior midline with tenderness.  Digital rectal exam and anoscopy deferred     I counseled patient on my exam findings that she has a prominent posterior external hemorrhoid/skin in addition to hypertonic sphincter tone with tearing of the posterior midline anoderm and possible fissure.  I recommend planning to go to the operating room for anal exam by anesthesia anoscopy, perianal injection of Botox and external hemorrhoidectomy.  The details of this procedure were discussed including the expected recovery time, risks, benefits and alternatives.  Patient expressed understanding and was agreeable to schedule surgery with me.  This has been scheduled for 12/20/2024.    Patient presents for elective surgery today.  Consent was signed.  All questions answered.    Surgical Findings:   Large right posterior lateral hemorrhoid with prominent external component and prolapsed, partially necrotic internal component.  Hypertonic sphincter tone and small posterior midline anal fissure.    Description of Procedure:   Patient was brought to the operating room on the transport cart.  Bilateral sequential compression devices were placed. Preoperative antibiotics were given.  Patient was induced under general endotracheal anesthesia.  Patient was then carefully flipped prone onto the OR table with all pressure points well-padded.  Patient was positioned in prone jackknife with the buttocks retracted apart with silk tape.  The anus and perianal skin were prepped and draped in the usual sterile fashion.  A timeout was performed.    I began with external exam of the anus, digital rectal exam and anoscopy using a small Hill-Toussaint anoscope.  I encountered the findings as described above.  I decided to start with excisional hemorrhoidectomy.  The  prominent right posterior lateral hemorrhoid was grasped with an Allis clamp and elevated.  The skin and hemorrhoid tissue was dissected free from the underlying sphincter muscle using electrocautery.  The hemorrhoid tissue was passed off the field and sent to pathology.  Hemostasis was achieved within the wound base using electrocautery.    There was evidence of hypertonic sphincter tone and a small posterior midline anal fissure.  Therefore, I decided to proceed with perianal injection of Botox. 100 units of Botox was mixed with 7 cc of injectable saline.  A Skelton bivalve anoscope was used to place the anus under stretch and I was able to easily identify the internal anal sphincter muscle.  I injected the internal sphincter with 0.5 cc of the Botox solution circumferentially at 12 clock face positions. The injection sites were hemostatic.     The anal canal was irrigated with warm saline solution and appeared hemostatic.  The mucosa at the hemorrhoidectomy excision site was reapproximated using a running, locking 2-0 Vicryl suture.  The skin at the hemorrhoidectomy excision site was reapproximated using interrupted 3-0 chromic sutures.  30 cc of 0.5% Marcaine with epinephrine was injected around the hemorrhoidectomy incision line and anus for local anesthesia.  The skin was cleaned and dried and a dressing of 4 x 4 gauze, ABD pad, tape and underwear was applied.    Patient was carefully flipped back supine onto the transport cart, awakened from anesthesia, extubated and transferred to the postanesthesia care unit in stable condition.  All sponge, needle, and instrument counts were correct at the end of the case.  I was present for the entire case.        Jax Melchor MD  12/20/2024  11:54 AM

## 2024-12-20 NOTE — ANESTHESIA POSTPROCEDURE EVALUATION
[facility]    Lachelle Jarrell Patient Status:  Hospital Outpatient Surgery   Age/Gender 29 year old female MRN [id number]   Location [facility] POST ANESTHESIA CARE UNIT Attending Jax Melchor MD   Hosp Day # 0 PCP NASIM STEPHANIERAMONA       Anesthesia Post-op Note    ANAL EXAMINATION UNDER ANESTHESIA EXCISIONAL HEMORRHOIDECTOMY PERIANAL INJECTION OF BOTOX, ANUSOCOPY    Procedure Summary       Date: 12/20/24 Room / Location:  MAIN OR  /  MAIN OR    Anesthesia Start: 1048 Anesthesia Stop:     Procedure: ANAL EXAMINATION UNDER ANESTHESIA EXCISIONAL HEMORRHOIDECTOMY PERIANAL INJECTION OF BOTOX, ANUSOCOPY Diagnosis:       Anal fissure      External hemorrhoid      Rectal pain      (Anal fissure [K60.2]External hemorrhoid [K64.4]Rectal pain [K62.89])    Surgeons: Jax Melchor MD Anesthesiologist: Jefferson Babin MD    Anesthesia Type: general ASA Status: 1            Anesthesia Type: general    Vitals Value Taken Time   /55 12/20/24 1142   Temp 98 12/20/24 1142   Pulse 101 12/20/24 1142   Resp 18 12/20/24 1142   SpO2 99 12/20/24 1142       Patient Location: PACU    Anesthesia Type: general    Airway Patency: patent    Postop Pain Control: adequate    Mental Status: mildly sedated but able to meaningfully participate in the post-anesthesia evaluation    Nausea/Vomiting: none    Cardiopulmonary/Hydration status: stable euvolemic    Complications: no apparent anesthesia related complications    Postop vital signs: stable    Dental Exam: Unchanged from Preop    Patient to be discharged from PACU when criteria met.

## 2024-12-20 NOTE — ANESTHESIA PROCEDURE NOTES
Airway  Date/Time: 12/20/2024 10:57 AM  Urgency: elective      General Information and Staff    Patient location during procedure: OR  Anesthesiologist: Jefferson Babin MD  Performed: anesthesiologist   Performed by: Jefferson Babin MD  Authorized by: Jefferson Babin MD      Indications and Patient Condition  Indications for airway management: anesthesia  Sedation level: deep  Preoxygenated: yes  Patient position: sniffing  Mask difficulty assessment: 1 - vent by mask    Final Airway Details  Final airway type: endotracheal airway      Successful airway: ETT  Cuffed: yes   Successful intubation technique: direct laryngoscopy  Endotracheal tube insertion site: oral  Blade: Adama  Blade size: #4  ETT size (mm): 7.0    Placement verified by: capnometry   Measured from: lips  Number of attempts at approach: 1

## 2024-12-20 NOTE — ANESTHESIA PREPROCEDURE EVALUATION
PRE-OP EVALUATION    Patient Name: Lachelle Jarrell    Admit Diagnosis: Anal fissure [K60.2]  External hemorrhoid [K64.4]  Rectal pain [K62.89]    Pre-op Diagnosis: Anal fissure [K60.2]  External hemorrhoid [K64.4]  Rectal pain [K62.89]    ANAL EXAMINATION UNDER ANESTHESIA EXCISIONAL HEMORRHOIDECTOMY PERIANAL INJECTION OF BOTOX    Anesthesia Procedure: ANAL EXAMINATION UNDER ANESTHESIA EXCISIONAL HEMORRHOIDECTOMY PERIANAL INJECTION OF BOTOX  HEMORRHOIDECTOMY    Surgeons and Role:     * Jax Melchor MD - Primary    Pre-op vitals reviewed.  Temp: 98.5 °F (36.9 °C)  Pulse: 73  Resp: 16  BP: 112/64  SpO2: 99 %  Body mass index is 18.61 kg/m².    Current medications reviewed.  Hospital Medications:   acetaminophen (Tylenol Extra Strength) tab 1,000 mg  1,000 mg Oral Once    scopolamine (Transderm-Scop) 1 MG/3DAYS patch 1 patch  1 patch Transdermal Once    lactated ringers infusion   Intravenous Continuous    ceFAZolin (Ancef) 2g in 10mL IV syringe premix  2 g Intravenous Once    And    metroNIDAZOLE in sodium chloride 0.79% (Flagyl) 5 mg/mL IVPB premix 500 mg  500 mg Intravenous Once       Outpatient Medications:   Prescriptions Prior to Admission[1]    Allergies: Patient has no known allergies.      Anesthesia Evaluation    Patient summary reviewed.    Anesthetic Complications           GI/Hepatic/Renal    Negative GI/hepatic/renal ROS.                             Cardiovascular    Negative cardiovascular ROS.                                                   Endo/Other    Negative endo/other ROS.                              Pulmonary    Negative pulmonary ROS.                       Neuro/Psych    Negative neuro/psych ROS.                                  Past Surgical History:   Procedure Laterality Date    Colonoscopy N/A 4/1/2024    Procedure: COLONOSCOPY;  Surgeon: Justina Sanz MD;  Location: [facility] MAIN OR    General anesthesia      hemorrhoidectomy     Social History     Socioeconomic History    Marital status:  Single   Tobacco Use    Smoking status: Never     Passive exposure: Never    Smokeless tobacco: Never   Vaping Use    Vaping status: Never Used   Substance and Sexual Activity    Alcohol use: Yes     Comment: SOCIAL    Drug use: Never     History   Drug Use Unknown     Available pre-op labs reviewed.               Airway      Mallampati: II       Cardiovascular    Cardiovascular exam normal.         Dental    Dentition appears grossly intact         Pulmonary    Pulmonary exam normal.                 Other findings              ASA: 1   Plan: general  NPO status verified and           Plan/risks discussed with: patient                Present on Admission:  **None**             [1]   Medications Prior to Admission   Medication Sig Dispense Refill Last Dose/Taking    Hydrocort-Pramoxine, Perianal, (ANALPRAM HC) 2.5-1 % External Cream Apply 1 Application topically in the morning and 1 Application before bedtime. 30 g 0 Taking    traMADol 50 MG Oral Tab Take 1 tablet (50 mg total) by mouth every 6 (six) hours as needed for Pain.   Unknown

## 2024-12-20 NOTE — DISCHARGE INSTRUCTIONS
Anal Surgery  Post-Surgical Instructions     Jax Melchor MD         MEDICATIONS  You will be given a prescription for pain.  Take 1 tablet every 6 hours as needed.  Pain medications will ease your pain, but you should expect some incisional pain for about 7-10 days.  You may take acetaminophen (Tylenol) up to 650 mg every 6 hours as needed for mild-moderate pain. You may take ibuprofen (Motrin) up to 600 mg every 6 hours as needed for mild-moderate pain. Take narcotic pain medication as needed for severe pain per your prescription. Do not drive while taking narcotic pain medication. Many patients take a stool softener as narcotics are known to cause constipation. You should walk often, cough and take deep breaths.       DIET  You will begin a high fiber diet.  The easiest way to a high fiber diet is to take a fiber supplement.  An excellent supplement is plain, unflavored Metamucil.  You should take one tablespoon in 8 oz of water twice a day.  Ideally, you should take the supplement before breakfast and dinner.  You may experience some gas bloating for the first 2 weeks. This is normal and will go away as long as you keep taking the supplement.  Other supplements that can be taken include Per Peyton, Benefiber, Konsyl, or Citrucel. Continue to take the fiber supplement for 1 month.     In addition, it is a good idea go to the drugstore and a stool softener, such as docusate, and a gentle laxative such as MiraLAX.  Taking MiraLAX daily and stool softener 1-2 times a day may prevent you from getting overly constipated while on the narcotic drug.  Once you stop taking the prescription pain medication, you may stop taking the stool softener and laxative.        WOUND CARE   You will perform sitz baths two-three times a day for 3-4 weeks.  Sitz baths simply mean soaking your anus in a tub of warm-hot water for about 15 minutes.  Sitz baths will clean the anal wound as well as relax the anal sphincter muscles, which  will help minimize your pain.  Be careful around the anal wound, especially if you have stitches on the outside.  Gently pat your anus dry (never rub) or simply dry your anus with a blow-dryer set to cool/warm.   Do not soak your anus for more than 15 minutes.        ACTIVITY  After surgery, your driving reflexes will be slower, especially if you are taking pain medications.  Therefore, you are restricted from driving until after your follow-up visit and after you have stopped taking your prescription pain meds.  You may walk about the house, go shopping, or eat at a restaurant.  You may also climb stairs and exercise. You can sit on your wound, but keep in mind that the less you sit, the less pain you will have.     APPOINTMENT  Please call our office for an appointment in 2-4 weeks after surgery, unless otherwise instructed.  This will allow ample time for the swelling and soreness to resolve before your wound is examined.  There may be some bleeding from your wound.  This is normal.  If you begin bleeding heavily, have fevers, chills, or if you are concerned about your wound, please call us immediately at [phone].     Thank you for entrusting us with your care.

## 2025-01-02 NOTE — PROGRESS NOTES
Post Operative Visit Note       Active Problems  1. Postop check         Chief Complaint   Chief Complaint   Patient presents with    Post-Op     PO 12/20 ANAL EXAMINATION UNDER ANESTHESIA EXCISIONAL HEMORRHOIDECTOMY PERIANAL INJECTION OF BOTOX, ANUSOCOPY w/Jill           History of Present Illness   29 year old female presents for postop visit following anal exam under anesthesia, excisional hemorrhoidectomy, perianal injection of Botox on 12/20/2024 with Dr. Melchor.  Patient states she overall believes she is recovering well.  She does have incisional pain, especially with bowel movements, though this seems to be improving this week.  She is no longer taking narcotic.  She reports constipation initially following surgery.  Her last 2 bowel movements have been slightly easier to pass and she denies any recent straining.  She Reports small amount of bleeding with bowel movements initially, though this is since resolved. Does note that she has not had a bowel movement in 2 days.  She is taking stool softener and fiber daily.  She denies any drainage.  No fevers or chills.          Allergies  Lachelle has No Known Allergies.    Past Medical / Surgical / Social / Family History    The past medical and past surgical history have been reviewed by me today.     History reviewed. No pertinent past medical history.  Past Surgical History:   Procedure Laterality Date    Colonoscopy N/A 4/1/2024    Procedure: COLONOSCOPY;  Surgeon: Justina Sanz MD;  Location: [facility] MAIN OR    General anesthesia      hemorrhoidectomy       The family history and social history have been reviewed by me today.    History reviewed. No pertinent family history.  Social History     Socioeconomic History    Marital status: Single   Tobacco Use    Smoking status: Never     Passive exposure: Never    Smokeless tobacco: Never   Vaping Use    Vaping status: Never Used   Substance and Sexual Activity    Alcohol use: Yes     Comment: SOCIAL    Drug use:  Never        Current Outpatient Medications:     HYDROcodone-acetaminophen 5-325 MG Oral Tab, Take 1-2 tablets by mouth every 4 (four) hours as needed., Disp: 20 tablet, Rfl: 0    Hydrocort-Pramoxine, Perianal, (ANALPRAM HC) 2.5-1 % External Cream, Apply 1 Application topically in the morning and 1 Application before bedtime., Disp: 30 g, Rfl: 0    traMADol 50 MG Oral Tab, Take 1 tablet (50 mg total) by mouth every 6 (six) hours as needed for Pain., Disp: , Rfl:       Review of Systems  A 10 point Review of Systems has been completed by me today and is negative except as above in the HPI.    Physical Findings   /67   Pulse 83   Temp 98.9 °F (37.2 °C) (Temporal)   Resp 18   LMP 12/19/2024 (Approximate)   SpO2 100%   Gen/psych: alert and oriented, cooperative, no apparent distress  Cardiovascular: regular rate  Respiratory: respirations unlabored, no wheeze  Abdominal: soft, non-tender, non-distended, no guarding/rebound  Rectal: Patient was examined in the prone jackknife position.  Right lateral incision healing well, suture remains in place, no drainage or bleeding, no erythema         Assessment/Plan  1. Postop check        29 year old female presents for postop visit following anal exam under anesthesia, excisional hemorrhoidectomy, perianal injection of Botox on 12/20/2024 with Dr. Melchor.      The patient is doing well postoperatively  Incision is healing well without any signs of infection  Continue with local wound care, including soap and water over incision.  Recommend sitz bath's.  Tylenol and ibuprofen as directed for pain  We discussed goal for daily bowel movement that is soft and easily passed.  Continue with daily MiraLAX and Metamucil.  Increase water intake.  She may use milk of magnesia as needed.  The patient is to continue with diet as tolerated.  Surgical pathology was discussed with the patient and consistent with hemorrhoid.  All questions and concerns were answered.  The patient is  scheduled to follow-up with Dr. Melchor in 3 weeks         No orders of the defined types were placed in this encounter.       Imaging & Referrals   None    Follow Up  Return if symptoms worsen or fail to improve.    Marielos Belle PA-C  [facility] General Surgery  1/2/2025  3:33 PM

## 2025-01-23 NOTE — PROGRESS NOTES
Follow Up Visit Note       Active Problems      1. Rectal pain    2. Constipation, unspecified constipation type          Chief Complaint   Chief Complaint   Patient presents with    Post-Op     PO- 12/20/2024 ANAL EXAMINATION UNDER ANESTHESIA EXCISIONAL HEMORRHOIDECTOMY PERIANAL INJECTION OF BOTOX, ANUSOCOPY. Pt reports good days and bad days. Bad days include pain and bleeding.        History of Present Illness  This is a very nice 29-year-old female with history of an anal fissure and symptomatic hemorrhoids who returns to clinic today with concern for recurrence of both her fissure and hemorrhoids.  Past surgical history notable for anal exam under anesthesia with anal dilation with Dr. Karen Jeffery a [facility] at [facility] [place], [place] on 1/23/2023.  She underwent repeat anal exam under anesthesia with dilation and excisional hemorrhoidectomy x2 on 3/15/2023. She underwent a colonoscopy with Dr. Sanz on 4/1/2024 which showed small internal hemorrhoids and anal skin tags.     Most recently, I took the patient for an anal exam under anesthesia with anoscopy, excisional hemorrhoidectomy and perianal injection of Botox on 12/20/2024.  Patient returns for postoperative follow-up today.  She states she is doing better overall.  She states that currently she is alternating between having good days and bad days in terms of rectal pain, bleeding and constipation.  She was recently in [place] on vacation and stated she had daily bowel movements there without any pain, straining or constipation.  Since returning to [place], she has had some recurrence of the symptoms.  She has had intermittent rectal bleeding that has been improving since surgery.  She does endorse some seepage after bowel movements.  She denies any prolapse, itching or incontinence.      Allergies  Lachelle has No Known Allergies.    Past Medical / Surgical / Social / Family History    The past medical and past surgical history have been  reviewed by me today.    History reviewed. No pertinent past medical history.  Past Surgical History:   Procedure Laterality Date    Colonoscopy N/A 4/1/2024    Procedure: COLONOSCOPY;  Surgeon: Justina Sanz MD;  Location: [facility] MAIN OR    General anesthesia      hemorrhoidectomy       The family history and social history have been reviewed by me today.    History reviewed. No pertinent family history.  Social History     Socioeconomic History    Marital status: Single   Tobacco Use    Smoking status: Never     Passive exposure: Never    Smokeless tobacco: Never   Vaping Use    Vaping status: Never Used   Substance and Sexual Activity    Alcohol use: Yes     Comment: SOCIAL    Drug use: Never        Current Outpatient Medications:     Hydrocort-Pramoxine, Perianal, (ANALPRAM HC) 2.5-1 % External Cream, Apply 1 Application topically in the morning and 1 Application before bedtime., Disp: 30 g, Rfl: 0    HYDROcodone-acetaminophen 5-325 MG Oral Tab, Take 1-2 tablets by mouth every 4 (four) hours as needed. (Patient not taking: Reported on 1/23/2025), Disp: 20 tablet, Rfl: 0    traMADol 50 MG Oral Tab, Take 1 tablet (50 mg total) by mouth every 6 (six) hours as needed for Pain. (Patient not taking: Reported on 1/23/2025), Disp: , Rfl:      Review of Systems  A 10 point review of systems was performed and negative unless otherwise documented per HPI.     Physical Findings   /70   Pulse 86   Temp 97.6 °F (36.4 °C)   Resp 18   LMP 12/19/2024 (Approximate)   SpO2 100%   Physical Exam  Vitals and nursing note reviewed. Exam conducted with a chaperone present.   Constitutional:       General: She is not in acute distress.  HENT:      Head: Normocephalic and atraumatic.      Mouth/Throat:      Mouth: Mucous membranes are moist.   Cardiovascular:      Rate and Rhythm: Normal rate and regular rhythm.   Pulmonary:      Effort: Pulmonary effort is normal.   Abdominal:      General: There is no distension.       Palpations: Abdomen is soft.      Tenderness: There is no abdominal tenderness.   Genitourinary:     Comments: Patient examined in the prone jackknife position with female medical assistant chaperone present.  External exam of the anus reveals some residual, mildly edematous external hemorrhoid/skin tag tissue in the right posterior position.  No obvious fissure visualized.  No external bleeding or drainage.  Digital rectal exam and anoscopy deferred.  Musculoskeletal:         General: No deformity.   Skin:     General: Skin is warm and dry.   Neurological:      General: No focal deficit present.      Mental Status: She is alert.   Psychiatric:         Mood and Affect: Mood normal.          Assessment   1. Rectal pain    2. Constipation, unspecified constipation type      This is a very nice 29-year-old female with history of an anal fissure and symptomatic hemorrhoids who returns to clinic today with concern for recurrence of both her fissure and hemorrhoids.  Past surgical history notable for anal exam under anesthesia with anal dilation with Dr. Karen Jeffery a general surgeon at [facility] in [place], [place] on 1/23/2023.  She underwent repeat anal exam under anesthesia with dilation and excisional hemorrhoidectomy x2 on 3/15/2023. She underwent a colonoscopy with Dr. Sanz on 4/1/2024 which showed small internal hemorrhoids and anal skin tags.     Most recently, I took the patient for an anal exam under anesthesia with anoscopy, excisional hemorrhoidectomy and perianal injection of Botox on 12/20/2024.  Patient returns for postoperative follow-up today.  She states she is doing better overall.  She states that currently she is alternating between having good days and bad days in terms of rectal pain, bleeding and constipation.  She was recently in [place] on vacation and stated she had daily bowel movements there without any pain, straining or constipation.  Since returning to [place], she has had some  recurrence of the symptoms.  She has had intermittent rectal bleeding that has been improving since surgery.  She does endorse some seepage after bowel movements.  She denies any prolapse, itching or incontinence.    External exam of the anus reveals some residual, mildly edematous external hemorrhoid/skin tag tissue in the right posterior position.  No obvious fissure visualized.  No external bleeding or drainage.  Digital rectal exam and anoscopy deferred.    Plan   I recommend a high-fiber diet, drinking least 64 ounces of fluids daily and a fiber supplement/bowel regimen as needed to prevent straining and constipation.  Okay to take over-the-counter pain medication and sitz bath's as needed.    Overall, I am satisfied that the patient's symptoms have improved since surgery.  I counseled her that it could take up to 2-3 months for the hemorrhoidectomy wound to fully heal.  I recommend watchful observation of this area.    I suggested a trial of pelvic floor physical therapy to see if this will help with relaxation of the patient's sphincter and pelvic floor musculature and alleviate some of her intermittent constipation issues.  Patient was interested in pelvic floor physical therapy.  I referred her to the [facility].    I would like see the patient back in 2 months time for ongoing follow-up.  Patient is welcome to contact me sooner with any new or worsening anorectal symptoms.     No orders of the defined types were placed in this encounter.      Imaging & Referrals   PELVIC FLOOR THERAPY - INTERNAL    Follow Up  No follow-ups on file.    Jax Melchor MD

## 2025-03-27 NOTE — PROGRESS NOTES
The following individual(s) verbally consented to be recorded using ambient AI listening technology and understand that they can each withdraw their consent to this listening technology at any point by asking the clinician to turn off or pause the recording:    Patient name: Lachelle Jarrell  Additional names:

## 2025-03-27 NOTE — PROGRESS NOTES
Follow Up Visit Note    The following individual(s) verbally consented to be recorded using ambient AI listening technology and understand that they can each withdraw their consent to this listening technology at any point by asking the clinician to turn off or pause the recording:    Patient name: Lachelle Jarrell        Active Problems      1. Rectal pain    2. Constipation, unspecified constipation type    3. Anal fissure    4. Grade IV hemorrhoids          Chief Complaint   Chief Complaint   Patient presents with    Post-Op     PO  2 months f/u 12/20/24 ANAL EXAMINATION UNDER ANESTHESIA EXCISIONAL HEMORRHOIDECTOMY PERIANAL INJECTION OF BOTOX, ANUSOCOPY         History of Present Illness  History of Present Illness  Lachelle Jarrell is a 30 year old female with a history of a history of an anal fissure, symptomatic hemorrhoids and chronic constipation who presents for follow-up today.  Most recently, I performed an anal exam under anesthesia with anoscopy, excisional hemorrhoidectomy and perianal injection of Botox procedure on 12/20/2024.  I last saw the patient on 1/23/2025.    Anorectal symptoms have significantly improved since the last visit. She was referred for pelvic floor physical therapy but did not attend as her symptoms improved within a week. Currently, there is no pain, bleeding, or difficulty evacuating.    She states she remains dependent on Miralax and stool softeners to maintain regular bowel movements. She does become constipated when she forgets to take her medication.  No fecal incontinence.    She recalls noticing a possible skin tag forming previously, but it has not been bothersome recently.         Allergies  Lachelle has No Known Allergies.    Past Medical / Surgical / Social / Family History    The past medical and past surgical history have been reviewed by me today.    History reviewed. No pertinent past medical history.  Past Surgical History:   Procedure Laterality Date    Colonoscopy N/A  4/1/2024    Procedure: COLONOSCOPY;  Surgeon: Justina Sanz MD;  Location: [facility] MAIN OR    General anesthesia      hemorrhoidectomy       The family history and social history have been reviewed by me today.    History reviewed. No pertinent family history.  Social History     Socioeconomic History    Marital status: Single   Tobacco Use    Smoking status: Never     Passive exposure: Never    Smokeless tobacco: Never   Vaping Use    Vaping status: Never Used   Substance and Sexual Activity    Alcohol use: Yes     Comment: SOCIAL    Drug use: Never      Current Outpatient Medications:   HYDROcodone-acetaminophen 5-325 MG Oral Tab, Take 1-2 tablets by mouth every 4 (four) hours as needed. (Patient not taking: Reported on 1/23/2025), Disp: 20 tablet, Rfl: 0  Hydrocort-Pramoxine, Perianal, (ANALPRAM HC) 2.5-1 % External Cream, Apply 1 Application topically in the morning and 1 Application before bedtime., Disp: 30 g, Rfl: 0     Review of Systems  A 10 point review of systems was performed and negative unless otherwise documented per HPI.     Physical Findings   /66   Pulse 79   Temp 98.1 °F (36.7 °C) (Temporal)   Resp 18   LMP 03/13/2025 (Approximate)   SpO2 100%   Physical Exam  Vitals and nursing note reviewed. Exam conducted with a chaperone present.   Constitutional:       General: She is not in acute distress.  HENT:      Head: Normocephalic and atraumatic.      Mouth/Throat:      Mouth: Mucous membranes are moist.   Cardiovascular:      Rate and Rhythm: Normal rate and regular rhythm.   Pulmonary:      Effort: Pulmonary effort is normal.   Abdominal:      General: There is no distension.      Palpations: Abdomen is soft.      Tenderness: There is no abdominal tenderness.   Genitourinary:     Comments: Patient examined in the prone jackknife position with female medical assistant chaperone present.  External exam of the anus reveals minimal residual anal skin tags.  No bleeding, tenderness or drainage.   No evidence of a persistent fissure.  Musculoskeletal:         General: No deformity.   Skin:     General: Skin is warm and dry.   Neurological:      General: No focal deficit present.      Mental Status: She is alert.   Psychiatric:         Mood and Affect: Mood normal.           Assessment & Plan  History of rectal pain with anal fissure and symptomatic hemorrhoids in the past.  Minimal residual skin tags noted on exam today.  Currently asymptomatic.  - Continue current bowel regimen with Miralax and stool softeners.    Constipation, unspecified constipation type  Effective symptom control with Miralax and stool softeners.   - Continue Miralax and stool softeners as needed.  - Consider pelvic floor PT if ongoing evacuation/constipation issues in the future    No further follow-up scheduled, but patient is welcome to contact me at anytime in the future with any surgical or anorectal questions/concerns.          No orders of the defined types were placed in this encounter.    Imaging & Referrals   None    Follow Up  No follow-ups on file.    Jax Melchor MD

## (undated) NOTE — LETTER
10/19/23    Patient: Joel Aden  : 1995 Visit date: 10/19/2023    Dear  Alcario Cabot, MD    Thank you for referring Joel Aden to my practice. Please find my assessment and plan below. Assessment   Anal fissure  (primary encounter diagnosis)    This is a very nice 20-year-old female who was referred to me by Dr. Gris Anne, a gastroenterologist at [place], in regards to intermittent anorectal pain and bleeding. Patient has been diagnosed with anal fissure and hemorrhoids at an outside hospital.  She underwent anal exam under anesthesia with anal dilation with a Dr. Yessenia Love a general surgeon at [facility] in [facility] on 2023. She underwent repeat anal exam under anesthesia with dilation and excisional hemorrhoidectomy x2 on 3/15/2023. She did well initially after the surgery but had recurrent pain and bleeding after a bout of constipation in late September. Her symptoms ultimately resolved again. Patient has had no lifelong issues with constipation prior to this last year. Over this last year and after the surgeries, patient has been constipated and has relied on MiraLAX to prevent constipation. She occasionally takes a stool softener as well. She denies any pain or bleeding with bowel movements over the last week. She endorses occasional prolapse of tissue. She denies any itching, seepage or incontinence. She has had no children. No prior colonoscopy. No family history of colon or rectal cancer or IBD. External exam of the anus reveals small anal skin tags along the right side of the anus. Digital rectal exam performed with fifth digit and shows very hypertonic sphincter tone without any masses, bleeding or tenderness. No obvious fissure appreciated with gentle spreading of the anoderm. Plan  At this point, patient has no ongoing anorectal symptoms and there is no evidence of a fissure on bedside examination.   She does have very hypertonic sphincter tone on exam. Therefore, she is at risk for recurrent fissure. She is not a candidate for any further invasive surgical procedures at this point given the fact that she is asymptomatic without an active fissure. I did however prescribe her lidocaine/nifedipine ointment through a compound pharmacy. I advised her to use it at least twice a day, ideally 3 times a day if she has return of symptoms. I advised her to also follow-up with me again should she have return of symptoms as she would be a candidate for surgery in the form of perianal injection of Botox versus lateral internal sphincterotomy. I advised her to have ongoing follow-up with her gastroenterologist Dr. Paolo Josue and I do agree with proceeding with colonoscopy to rule out any higher up intraluminal pathology given her history of new onset constipation and intermittent rectal bleeding.       Sincerely,       Bridget Ledesma MD   CC:   No Recipients